# Patient Record
Sex: MALE | Race: WHITE | NOT HISPANIC OR LATINO
[De-identification: names, ages, dates, MRNs, and addresses within clinical notes are randomized per-mention and may not be internally consistent; named-entity substitution may affect disease eponyms.]

---

## 2017-04-01 ENCOUNTER — TRANSCRIPTION ENCOUNTER (OUTPATIENT)
Age: 77
End: 2017-04-01

## 2018-04-08 ENCOUNTER — TRANSCRIPTION ENCOUNTER (OUTPATIENT)
Age: 78
End: 2018-04-08

## 2021-12-08 PROBLEM — Z00.00 ENCOUNTER FOR PREVENTIVE HEALTH EXAMINATION: Status: ACTIVE | Noted: 2021-12-08

## 2022-01-06 ENCOUNTER — NON-APPOINTMENT (OUTPATIENT)
Age: 82
End: 2022-01-06

## 2022-01-06 ENCOUNTER — APPOINTMENT (OUTPATIENT)
Dept: HEART AND VASCULAR | Facility: CLINIC | Age: 82
End: 2022-01-06
Payer: MEDICARE

## 2022-01-06 VITALS
RESPIRATION RATE: 12 BRPM | BODY MASS INDEX: 27.77 KG/M2 | WEIGHT: 194 LBS | OXYGEN SATURATION: 97 % | TEMPERATURE: 97.3 F | HEIGHT: 70 IN | SYSTOLIC BLOOD PRESSURE: 132 MMHG | DIASTOLIC BLOOD PRESSURE: 68 MMHG | HEART RATE: 60 BPM

## 2022-01-06 DIAGNOSIS — Z80.1 FAMILY HISTORY OF MALIGNANT NEOPLASM OF TRACHEA, BRONCHUS AND LUNG: ICD-10-CM

## 2022-01-06 DIAGNOSIS — Z81.1 FAMILY HISTORY OF ALCOHOL ABUSE AND DEPENDENCE: ICD-10-CM

## 2022-01-06 DIAGNOSIS — Z85.820 PERSONAL HISTORY OF MALIGNANT MELANOMA OF SKIN: ICD-10-CM

## 2022-01-06 DIAGNOSIS — Z87.891 PERSONAL HISTORY OF NICOTINE DEPENDENCE: ICD-10-CM

## 2022-01-06 DIAGNOSIS — Z86.79 PERSONAL HISTORY OF OTHER DISEASES OF THE CIRCULATORY SYSTEM: ICD-10-CM

## 2022-01-06 DIAGNOSIS — E80.6 OTHER DISORDERS OF BILIRUBIN METABOLISM: ICD-10-CM

## 2022-01-06 DIAGNOSIS — Z82.3 FAMILY HISTORY OF STROKE: ICD-10-CM

## 2022-01-06 DIAGNOSIS — Z87.438 PERSONAL HISTORY OF OTHER DISEASES OF MALE GENITAL ORGANS: ICD-10-CM

## 2022-01-06 DIAGNOSIS — Z78.9 OTHER SPECIFIED HEALTH STATUS: ICD-10-CM

## 2022-01-06 DIAGNOSIS — Z79.01 LONG TERM (CURRENT) USE OF ANTICOAGULANTS: ICD-10-CM

## 2022-01-06 DIAGNOSIS — Z83.79 FAMILY HISTORY OF OTHER DISEASES OF THE DIGESTIVE SYSTEM: ICD-10-CM

## 2022-01-06 DIAGNOSIS — Z87.39 PERSONAL HISTORY OF OTHER DISEASES OF THE MUSCULOSKELETAL SYSTEM AND CONNECTIVE TISSUE: ICD-10-CM

## 2022-01-06 PROCEDURE — 93000 ELECTROCARDIOGRAM COMPLETE: CPT

## 2022-01-06 PROCEDURE — 99215 OFFICE O/P EST HI 40 MIN: CPT

## 2022-01-06 RX ORDER — METOPROLOL SUCCINATE 50 MG/1
50 TABLET, EXTENDED RELEASE ORAL
Refills: 0 | Status: DISCONTINUED | COMMUNITY
End: 2022-01-06

## 2022-01-06 NOTE — REASON FOR VISIT
[Arrhythmia/ECG Abnorrmalities] : arrhythmia/ECG abnormalities [Structural Heart and Valve Disease] : structural heart and valve disease [Hyperlipidemia] : hyperlipidemia [Hypertension] : hypertension [Coronary Artery Disease] : coronary artery disease [Carotid, Aortic and Peripheral Vascular Disease] : carotid, aortic and peripheral vascular disease [FreeTextEntry3] : Ant Guevara

## 2022-01-06 NOTE — PHYSICAL EXAM
[Well Developed] : well developed [Well Nourished] : well nourished [No Acute Distress] : no acute distress [Normal Conjunctiva] : normal conjunctiva [Normal Venous Pressure] : normal venous pressure [No Carotid Bruit] : no carotid bruit [Normal S1, S2] : normal S1, S2 [No Rub] : no rub [No Gallop] : no gallop [Murmur] : murmur [Normal Rate] : normal [Irregularly Irregular] : irregularly irregular [Clear Lung Fields] : clear lung fields [Good Air Entry] : good air entry [No Respiratory Distress] : no respiratory distress  [Soft] : abdomen soft [Non Tender] : non-tender [No Masses/organomegaly] : no masses/organomegaly [Normal Bowel Sounds] : normal bowel sounds [Normal Gait] : normal gait [No Edema] : no edema [No Cyanosis] : no cyanosis [No Clubbing] : no clubbing [No Varicosities] : no varicosities [No Rash] : no rash [No Skin Lesions] : no skin lesions [Moves all extremities] : moves all extremities [No Focal Deficits] : no focal deficits [Normal Speech] : normal speech [Alert and Oriented] : alert and oriented [Normal memory] : normal memory [de-identified] : sys murmur

## 2022-01-06 NOTE — DISCUSSION/SUMMARY
[Atrial Fibrillation] : atrial fibrillation [Coronary Artery Disease] : coronary artery disease [Hyperlipidemia] : hyperlipidemia [Diet Modification] : diet modification [Exercise] : exercise [Hypertension] : hypertension [Exercise Regimen] : an exercise regimen [Dietary Modification] : dietary modification [Low Sodium Diet] : low sodium diet [Mitral Regurgitation] : mitral regurgitation [Compensated] : compensated [Venous Insufficiency] : venous insufficiency [Stable] : stable [None] : There are no changes in medication management [Exercise Rehab] : exercise rehabilitation [Patient] : the patient [de-identified] : TAA; carotid artery plaque [FreeTextEntry1] : TR - mild

## 2022-01-06 NOTE — HISTORY OF PRESENT ILLNESS
[FreeTextEntry1] : Timmy Ureña returns for follow up.  He was last seen by me over 10 years ago.  \par \par He has been followed by Dr Logan Rodríguez.\par \par He denies cp, sob, pnd, orthopnea, edema, palp, or loc.\par \par He is active.  He is compliant with meds.\par \par ECG today reveals AIFB, NS ST T changes, consider ischemia\par \par Clinical hx reviewed in detail.\par \par Recommendations:\par 1. collect records from [facility]\par 2. collect records from primary care \par 3. exercise\par 4. continue CV meds\par 5. EXSE\par 6. carotid duplex \par

## 2022-07-21 ENCOUNTER — NON-APPOINTMENT (OUTPATIENT)
Age: 82
End: 2022-07-21

## 2023-02-13 ENCOUNTER — NON-APPOINTMENT (OUTPATIENT)
Age: 83
End: 2023-02-13

## 2023-03-22 ENCOUNTER — TRANSCRIPTION ENCOUNTER (OUTPATIENT)
Age: 83
End: 2023-03-22

## 2023-05-01 ENCOUNTER — APPOINTMENT (OUTPATIENT)
Dept: HEART AND VASCULAR | Facility: CLINIC | Age: 83
End: 2023-05-01
Payer: MEDICARE

## 2023-05-01 ENCOUNTER — NON-APPOINTMENT (OUTPATIENT)
Age: 83
End: 2023-05-01

## 2023-05-01 VITALS
OXYGEN SATURATION: 96 % | HEIGHT: 70 IN | SYSTOLIC BLOOD PRESSURE: 130 MMHG | DIASTOLIC BLOOD PRESSURE: 70 MMHG | WEIGHT: 180 LBS | HEART RATE: 94 BPM | RESPIRATION RATE: 12 BRPM | TEMPERATURE: 97.3 F | BODY MASS INDEX: 25.77 KG/M2

## 2023-05-01 PROCEDURE — 93000 ELECTROCARDIOGRAM COMPLETE: CPT

## 2023-05-01 PROCEDURE — 99215 OFFICE O/P EST HI 40 MIN: CPT

## 2023-05-01 RX ORDER — OXYCODONE AND ACETAMINOPHEN 5; 325 MG/1; MG/1
5-325 TABLET ORAL
Qty: 10 | Refills: 0 | Status: COMPLETED | COMMUNITY
Start: 2023-03-22

## 2023-05-01 RX ORDER — PREDNISONE 20 MG/1
20 TABLET ORAL
Qty: 60 | Refills: 0 | Status: COMPLETED | COMMUNITY
Start: 2023-02-18

## 2023-05-01 RX ORDER — KETOCONAZOLE 20 MG/G
2 CREAM TOPICAL
Qty: 30 | Refills: 0 | Status: COMPLETED | COMMUNITY
Start: 2022-12-25

## 2023-05-02 ENCOUNTER — NON-APPOINTMENT (OUTPATIENT)
Age: 83
End: 2023-05-02

## 2023-05-02 NOTE — HISTORY OF PRESENT ILLNESS
[FreeTextEntry1] : Timmy Ureña returns for follow up.  He was last seen by me in January 2022.\par \par He has been followed by Dr Logan Rodríguez.\par \par He injued his L LE in a screen door several days ago. He did not have pain.  He did develop significant ecchymosis in L LE that is currently present in the foot/ankle.  He was partially evaluated at [facility] - LE venous duplex was unremarkable for DVT.\par \par He denies cp, sob, pnd, orthopnea, edema, palp, or loc.\par \par He is active.  He is compliant with meds.\par \par ECG today reveals AIFB, RBBB, ST T changes, consider ischemia\par \par Clinical hx reviewed in detail.\par \par Recommendations:\par 1. collect records from [facility]\par 2. collect records from primary care \par 3. collect records from [place]\par 4. continue CV meds\par 5. warm compresses and leg elevation\par

## 2023-05-02 NOTE — DISCUSSION/SUMMARY
[Atrial Fibrillation] : atrial fibrillation [Coronary Artery Disease] : coronary artery disease [Hyperlipidemia] : hyperlipidemia [Diet Modification] : diet modification [Exercise] : exercise [Hypertension] : hypertension [Exercise Regimen] : an exercise regimen [Dietary Modification] : dietary modification [Low Sodium Diet] : low sodium diet [Mitral Regurgitation] : mitral regurgitation [Compensated] : compensated [Venous Insufficiency] : venous insufficiency [Stable] : stable [None] : There are no changes in medication management [Exercise Rehab] : exercise rehabilitation [Patient] : the patient [de-identified] : TAA; carotid artery plaque [FreeTextEntry1] : TR - mild [EKG obtained to assist in diagnosis and management of assessed problem(s)] : EKG obtained to assist in diagnosis and management of assessed problem(s)

## 2023-05-02 NOTE — PHYSICAL EXAM
[Well Developed] : well developed [Well Nourished] : well nourished [No Acute Distress] : no acute distress [Normal Conjunctiva] : normal conjunctiva [Normal Venous Pressure] : normal venous pressure [No Carotid Bruit] : no carotid bruit [Normal S1, S2] : normal S1, S2 [No Rub] : no rub [No Gallop] : no gallop [Murmur] : murmur [Normal Rate] : normal [Irregularly Irregular] : irregularly irregular [Clear Lung Fields] : clear lung fields [Good Air Entry] : good air entry [No Respiratory Distress] : no respiratory distress  [Soft] : abdomen soft [Non Tender] : non-tender [No Masses/organomegaly] : no masses/organomegaly [Normal Bowel Sounds] : normal bowel sounds [Normal Gait] : normal gait [No Cyanosis] : no cyanosis [No Clubbing] : no clubbing [Edema ___] : edema [unfilled] [Venous varicosities] : venous varicosities [No Rash] : no rash [No Skin Lesions] : no skin lesions [Moves all extremities] : moves all extremities [No Focal Deficits] : no focal deficits [Normal Speech] : normal speech [Alert and Oriented] : alert and oriented [Normal memory] : normal memory [de-identified] : sys murmur

## 2023-05-05 ENCOUNTER — NON-APPOINTMENT (OUTPATIENT)
Age: 83
End: 2023-05-05

## 2023-05-26 ENCOUNTER — APPOINTMENT (OUTPATIENT)
Dept: VASCULAR SURGERY | Facility: CLINIC | Age: 83
End: 2023-05-26
Payer: MEDICARE

## 2023-05-26 PROCEDURE — 99204 OFFICE O/P NEW MOD 45 MIN: CPT

## 2023-06-02 ENCOUNTER — APPOINTMENT (OUTPATIENT)
Dept: VASCULAR SURGERY | Facility: CLINIC | Age: 83
End: 2023-06-02
Payer: MEDICARE

## 2023-06-02 VITALS
BODY MASS INDEX: 26.92 KG/M2 | TEMPERATURE: 97.6 F | RESPIRATION RATE: 16 BRPM | DIASTOLIC BLOOD PRESSURE: 70 MMHG | WEIGHT: 188 LBS | OXYGEN SATURATION: 97 % | HEART RATE: 76 BPM | SYSTOLIC BLOOD PRESSURE: 140 MMHG | HEIGHT: 70 IN

## 2023-06-02 DIAGNOSIS — R60.9 EDEMA, UNSPECIFIED: ICD-10-CM

## 2023-06-02 PROCEDURE — 99213 OFFICE O/P EST LOW 20 MIN: CPT

## 2023-06-02 RX ORDER — GABAPENTIN 300 MG/1
300 CAPSULE ORAL
Qty: 30 | Refills: 0 | Status: DISCONTINUED | COMMUNITY
Start: 2023-02-11 | End: 2023-06-02

## 2023-06-02 RX ORDER — MUPIROCIN 20 MG/G
2 OINTMENT TOPICAL
Refills: 0 | Status: DISCONTINUED | COMMUNITY
End: 2023-06-02

## 2023-06-03 NOTE — HISTORY OF PRESENT ILLNESS
[FreeTextEntry1] : 81 yo male seen last week with severe bilateral lower extremity edema returns in follow up. He reports that the edema has improved since last week. He is taking Lasix as prescribed. He is unable to wear compression stockings and cannot apply ace wraps.

## 2023-06-03 NOTE — REVIEW OF SYSTEMS
[Fever] : no fever [Chills] : no chills [Lower Ext Edema] : lower extremity edema [Limb Swelling] : limb swelling

## 2023-06-03 NOTE — ASSESSMENT
[FreeTextEntry1] : 81 yo male with severe lower extremity edema. His edema has improved with diuretic therapy although it is still significant. I recommended that he continue to take Lasix as prescribed. He was placed in bilateral lower extremity ace wraps. He was scheduled for a venous duplex. \par \par Follow up when the results of the duplex are available.

## 2023-06-03 NOTE — PHYSICAL EXAM
[JVD] : no jugular venous distention  [Normal Breath Sounds] : Normal breath sounds [Ankle Swelling (On Exam)] : present [Ankle Swelling Bilaterally] : bilaterally  [Ankle Swelling On The Left] : moderate [Alert] : alert [Oriented to Person] : oriented to person [Oriented to Place] : oriented to place [Oriented to Time] : oriented to time [de-identified] : Awake and Alert [de-identified] : right forefoot erythema impoved [de-identified] : Appropriate

## 2023-06-09 ENCOUNTER — APPOINTMENT (OUTPATIENT)
Dept: HEART AND VASCULAR | Facility: CLINIC | Age: 83
End: 2023-06-09
Payer: MEDICARE

## 2023-06-09 PROCEDURE — 93970 EXTREMITY STUDY: CPT

## 2023-06-26 ENCOUNTER — APPOINTMENT (OUTPATIENT)
Dept: VASCULAR SURGERY | Facility: CLINIC | Age: 83
End: 2023-06-26

## 2023-07-31 ENCOUNTER — APPOINTMENT (OUTPATIENT)
Dept: HEART AND VASCULAR | Facility: CLINIC | Age: 83
End: 2023-07-31
Payer: MEDICARE

## 2023-07-31 VITALS
BODY MASS INDEX: 24.62 KG/M2 | HEIGHT: 70 IN | SYSTOLIC BLOOD PRESSURE: 122 MMHG | DIASTOLIC BLOOD PRESSURE: 80 MMHG | WEIGHT: 172 LBS | RESPIRATION RATE: 17 BRPM | OXYGEN SATURATION: 97 % | HEART RATE: 74 BPM | TEMPERATURE: 97.6 F

## 2023-07-31 PROCEDURE — 99215 OFFICE O/P EST HI 40 MIN: CPT

## 2023-08-01 ENCOUNTER — APPOINTMENT (OUTPATIENT)
Dept: HEART AND VASCULAR | Facility: CLINIC | Age: 83
End: 2023-08-01
Payer: MEDICARE

## 2023-08-01 PROCEDURE — 93306 TTE W/DOPPLER COMPLETE: CPT

## 2023-08-06 NOTE — REASON FOR VISIT
[Arrhythmia/ECG Abnorrmalities] : arrhythmia/ECG abnormalities [Structural Heart and Valve Disease] : structural heart and valve disease [Hypertension] : hypertension [Hyperlipidemia] : hyperlipidemia [Coronary Artery Disease] : coronary artery disease [Carotid, Aortic and Peripheral Vascular Disease] : carotid, aortic and peripheral vascular disease [FreeTextEntry3] : Ant Guevara

## 2023-08-06 NOTE — DISCUSSION/SUMMARY
[Bundle Branch Block] : ~T bundle branch block [Atrial Fibrillation] : atrial fibrillation [Coronary Artery Disease] : coronary artery disease [Hyperlipidemia] : hyperlipidemia [Diet Modification] : diet modification [Exercise] : exercise [Hypertension] : hypertension [Exercise Regimen] : an exercise regimen [Dietary Modification] : dietary modification [Low Sodium Diet] : low sodium diet [Mitral Regurgitation] : mitral regurgitation [Compensated] : compensated [Sodium Restriction] : sodium restriction [Venous Insufficiency] : venous insufficiency [None] : There are no changes in medication management [Exercise Rehab] : exercise rehabilitation [Pulmonary Hypertension (PH)] : pulmonary hypertension (PH) [Stable] : stable [Medication Changes Per Orders] : Medication changes are as documented in orders [Patient] : the patient [de-identified] : LVH [de-identified] : at least moderate [de-identified] : TAA; carotid artery plaque [de-identified] : mild [FreeTextEntry1] : TR - mild to mod

## 2023-08-06 NOTE — PHYSICAL EXAM
[Well Developed] : well developed [Well Nourished] : well nourished [No Acute Distress] : no acute distress [Normal Conjunctiva] : normal conjunctiva [Normal Venous Pressure] : normal venous pressure [No Carotid Bruit] : no carotid bruit [Normal S1, S2] : normal S1, S2 [No Rub] : no rub [No Gallop] : no gallop [Normal Rate] : normal [Murmur] : murmur [Irregularly Irregular] : irregularly irregular [Clear Lung Fields] : clear lung fields [Good Air Entry] : good air entry [No Respiratory Distress] : no respiratory distress  [Soft] : abdomen soft [Non Tender] : non-tender [No Masses/organomegaly] : no masses/organomegaly [Normal Bowel Sounds] : normal bowel sounds [Normal Gait] : normal gait [No Cyanosis] : no cyanosis [No Clubbing] : no clubbing [Edema ___] : edema [unfilled] [No Rash] : no rash [Venous varicosities] : venous varicosities [No Skin Lesions] : no skin lesions [Moves all extremities] : moves all extremities [No Focal Deficits] : no focal deficits [Normal Speech] : normal speech [Alert and Oriented] : alert and oriented [Normal memory] : normal memory [de-identified] : sys murmur

## 2023-08-06 NOTE — HISTORY OF PRESENT ILLNESS
[FreeTextEntry1] : Timmy Ureña returns for follow up.    He has been followed by Dr Logan Rodríguez.  He denies cp, sob, pnd, orthopnea, palp, or loc.  He has LE edema - currently better.  He is active.  He is compliant with meds.  Clinical hx reviewed in detail.  Recommendations: 1. continue CV meds 2. compression stocking and leg elevation 3. TTE - see report 4. Vasc Med eval 5. advanced cardiac imaging - cardiac MRI vs NIKOLAI

## 2023-08-17 ENCOUNTER — NON-APPOINTMENT (OUTPATIENT)
Age: 83
End: 2023-08-17

## 2023-08-23 ENCOUNTER — OUTPATIENT (OUTPATIENT)
Dept: OUTPATIENT SERVICES | Facility: HOSPITAL | Age: 83
LOS: 1 days | End: 2023-08-23
Payer: MEDICARE

## 2023-08-23 DIAGNOSIS — I71.20 THORACIC AORTIC ANEURYSM, WITHOUT RUPTURE, UNSPECIFIED: ICD-10-CM

## 2023-08-23 DIAGNOSIS — I34.9 NONRHEUMATIC MITRAL VALVE DISORDER, UNSPECIFIED: ICD-10-CM

## 2023-08-23 PROCEDURE — 76376 3D RENDER W/INTRP POSTPROCES: CPT | Mod: 26

## 2023-08-23 PROCEDURE — 93312 ECHO TRANSESOPHAGEAL: CPT | Mod: 26

## 2023-08-23 PROCEDURE — 93312 ECHO TRANSESOPHAGEAL: CPT

## 2024-01-04 ENCOUNTER — APPOINTMENT (OUTPATIENT)
Dept: HEART AND VASCULAR | Facility: CLINIC | Age: 84
End: 2024-01-04
Payer: MEDICARE

## 2024-01-04 VITALS
OXYGEN SATURATION: 98 % | TEMPERATURE: 97.6 F | SYSTOLIC BLOOD PRESSURE: 120 MMHG | DIASTOLIC BLOOD PRESSURE: 57 MMHG | RESPIRATION RATE: 12 BRPM | WEIGHT: 175 LBS | BODY MASS INDEX: 25.05 KG/M2 | HEART RATE: 54 BPM | HEIGHT: 70 IN

## 2024-01-04 DIAGNOSIS — I34.9 NONRHEUMATIC MITRAL VALVE DISORDER, UNSPECIFIED: ICD-10-CM

## 2024-01-04 DIAGNOSIS — I25.10 ATHEROSCLEROTIC HEART DISEASE OF NATIVE CORONARY ARTERY W/OUT ANGINA PECTORIS: ICD-10-CM

## 2024-01-04 DIAGNOSIS — I87.2 VENOUS INSUFFICIENCY (CHRONIC) (PERIPHERAL): ICD-10-CM

## 2024-01-04 DIAGNOSIS — I65.29 OCCLUSION AND STENOSIS OF UNSPECIFIED CAROTID ARTERY: ICD-10-CM

## 2024-01-04 DIAGNOSIS — I51.7 CARDIOMEGALY: ICD-10-CM

## 2024-01-04 DIAGNOSIS — I36.9 NONRHEUMATIC TRICUSPID VALVE DISORDER, UNSPECIFIED: ICD-10-CM

## 2024-01-04 DIAGNOSIS — I73.9 PERIPHERAL VASCULAR DISEASE, UNSPECIFIED: ICD-10-CM

## 2024-01-04 PROCEDURE — 99215 OFFICE O/P EST HI 40 MIN: CPT

## 2024-01-04 RX ORDER — TAMSULOSIN HYDROCHLORIDE 0.4 MG/1
0.4 CAPSULE ORAL
Refills: 0 | Status: DISCONTINUED | COMMUNITY
End: 2024-01-04

## 2024-01-04 NOTE — DISCUSSION/SUMMARY
[Bundle Branch Block] : ~T bundle branch block [Atrial Fibrillation] : atrial fibrillation [Coronary Artery Disease] : coronary artery disease [Hyperlipidemia] : hyperlipidemia [Diet Modification] : diet modification [Exercise] : exercise [Hypertension] : hypertension [Exercise Regimen] : an exercise regimen [Dietary Modification] : dietary modification [Low Sodium Diet] : low sodium diet [Mitral Regurgitation] : mitral regurgitation [Compensated] : compensated [Sodium Restriction] : sodium restriction [Venous Insufficiency] : venous insufficiency [None] : There are no changes in medication management [Exercise Rehab] : exercise rehabilitation [Pulmonary Hypertension (PH)] : pulmonary hypertension (PH) [Stable] : stable [Medication Changes Per Orders] : Medication changes are as documented in orders [Patient] : the patient [de-identified] : LVH [de-identified] : at least moderate [de-identified] : TAA; carotid artery plaque [de-identified] : mild [FreeTextEntry1] : TR - mild to mod

## 2024-01-04 NOTE — PHYSICAL EXAM
[Well Developed] : well developed [Well Nourished] : well nourished [No Acute Distress] : no acute distress [Normal Conjunctiva] : normal conjunctiva [Normal Venous Pressure] : normal venous pressure [No Carotid Bruit] : no carotid bruit [Normal S1, S2] : normal S1, S2 [No Rub] : no rub [No Gallop] : no gallop [Murmur] : murmur [Normal Rate] : normal [Irregularly Irregular] : irregularly irregular [Clear Lung Fields] : clear lung fields [Good Air Entry] : good air entry [No Respiratory Distress] : no respiratory distress  [Soft] : abdomen soft [Non Tender] : non-tender [No Masses/organomegaly] : no masses/organomegaly [Normal Bowel Sounds] : normal bowel sounds [Normal Gait] : normal gait [No Cyanosis] : no cyanosis [No Clubbing] : no clubbing [Edema ___] : edema [unfilled] [Venous varicosities] : venous varicosities [No Rash] : no rash [No Skin Lesions] : no skin lesions [Moves all extremities] : moves all extremities [No Focal Deficits] : no focal deficits [Normal Speech] : normal speech [Alert and Oriented] : alert and oriented [Normal memory] : normal memory [de-identified] : sys murmur

## 2024-01-04 NOTE — HISTORY OF PRESENT ILLNESS
[FreeTextEntry1] : Timmy Ureña returns for follow up.    He has been followed by Dr Logan Rodríguez.  He denies cp, sob, pnd, orthopnea, palp, or loc.  He has LE edema - currently improved, he wears compression socks.  He is more active after right hip replacement (8/2023).  He is compliant with meds.  TTE 8/2023: nl lv sys fxn; LVH; indeterminant mendoza fxn; MV thickening w/ mod MR; mild to mod TR; mild pulm htn NIKOLAI 8/2023: nl left and right ventricular fxn; no LA/RA/LESLY/RAA thrombus, no intracardiac shunt, moderate MR  Clinical hx reviewed in detail.  Recommendations: 1. continue CV meds; continue Eliquis 5mg BID 2. compression stocking and leg elevation, vasc med eval, provided referral to Dr. Trinidad 3. pt is walking more after R hip replacement, will order EXSE 4. f/u in 4 months

## 2024-02-05 ENCOUNTER — RESULT REVIEW (OUTPATIENT)
Age: 84
End: 2024-02-05

## 2024-02-06 ENCOUNTER — RESULT REVIEW (OUTPATIENT)
Age: 84
End: 2024-02-06

## 2024-02-07 ENCOUNTER — TRANSCRIPTION ENCOUNTER (OUTPATIENT)
Age: 84
End: 2024-02-07

## 2024-04-15 ENCOUNTER — RX RENEWAL (OUTPATIENT)
Age: 84
End: 2024-04-15

## 2024-04-29 ENCOUNTER — APPOINTMENT (OUTPATIENT)
Dept: HEART AND VASCULAR | Facility: CLINIC | Age: 84
End: 2024-04-29
Payer: MEDICARE

## 2024-04-29 VITALS
BODY MASS INDEX: 24.34 KG/M2 | SYSTOLIC BLOOD PRESSURE: 122 MMHG | DIASTOLIC BLOOD PRESSURE: 60 MMHG | HEART RATE: 57 BPM | OXYGEN SATURATION: 97 % | HEIGHT: 70 IN | WEIGHT: 170 LBS

## 2024-04-29 PROCEDURE — 93325 DOPPLER ECHO COLOR FLOW MAPG: CPT

## 2024-04-29 PROCEDURE — 93351 STRESS TTE COMPLETE: CPT

## 2024-04-29 PROCEDURE — 93246 EXT ECG>7D<15D RECORDING: CPT

## 2024-04-29 PROCEDURE — 93320 DOPPLER ECHO COMPLETE: CPT

## 2024-04-30 LAB
ALBUMIN SERPL ELPH-MCNC: 4.2 G/DL
ALP BLD-CCNC: 129 U/L
ALT SERPL-CCNC: 25 U/L
ANION GAP SERPL CALC-SCNC: 14 MMOL/L
AST SERPL-CCNC: 34 U/L
BILIRUB SERPL-MCNC: 1.7 MG/DL
BUN SERPL-MCNC: 19 MG/DL
CALCIUM SERPL-MCNC: 9.4 MG/DL
CHLORIDE SERPL-SCNC: 103 MMOL/L
CO2 SERPL-SCNC: 24 MMOL/L
CREAT SERPL-MCNC: 0.8 MG/DL
EGFR: 87 ML/MIN/1.73M2
GLUCOSE SERPL-MCNC: 108 MG/DL
MAGNESIUM SERPL-MCNC: 2 MG/DL
NT-PROBNP SERPL-MCNC: 2263 PG/ML
POTASSIUM SERPL-SCNC: 4 MMOL/L
PROT SERPL-MCNC: 6.6 G/DL
SODIUM SERPL-SCNC: 141 MMOL/L

## 2024-05-02 ENCOUNTER — NON-APPOINTMENT (OUTPATIENT)
Age: 84
End: 2024-05-02

## 2024-05-05 ENCOUNTER — RESULT REVIEW (OUTPATIENT)
Age: 84
End: 2024-05-05

## 2024-05-06 ENCOUNTER — APPOINTMENT (OUTPATIENT)
Dept: HEART AND VASCULAR | Facility: CLINIC | Age: 84
End: 2024-05-06
Payer: MEDICARE

## 2024-05-06 ENCOUNTER — RESULT REVIEW (OUTPATIENT)
Age: 84
End: 2024-05-06

## 2024-05-06 PROCEDURE — 93248 EXT ECG>7D<15D REV&INTERPJ: CPT

## 2024-05-06 PROCEDURE — 93246 EXT ECG>7D<15D RECORDING: CPT

## 2024-05-10 ENCOUNTER — NON-APPOINTMENT (OUTPATIENT)
Age: 84
End: 2024-05-10

## 2024-05-20 ENCOUNTER — NON-APPOINTMENT (OUTPATIENT)
Age: 84
End: 2024-05-20

## 2024-05-20 ENCOUNTER — APPOINTMENT (OUTPATIENT)
Dept: HEART AND VASCULAR | Facility: CLINIC | Age: 84
End: 2024-05-20
Payer: MEDICARE

## 2024-05-20 VITALS
RESPIRATION RATE: 14 BRPM | SYSTOLIC BLOOD PRESSURE: 126 MMHG | HEIGHT: 70 IN | WEIGHT: 170 LBS | OXYGEN SATURATION: 98 % | HEART RATE: 55 BPM | DIASTOLIC BLOOD PRESSURE: 54 MMHG | BODY MASS INDEX: 24.34 KG/M2

## 2024-05-20 DIAGNOSIS — I48.91 UNSPECIFIED ATRIAL FIBRILLATION: ICD-10-CM

## 2024-05-20 DIAGNOSIS — I10 ESSENTIAL (PRIMARY) HYPERTENSION: ICD-10-CM

## 2024-05-20 DIAGNOSIS — E78.5 HYPERLIPIDEMIA, UNSPECIFIED: ICD-10-CM

## 2024-05-20 DIAGNOSIS — I45.10 UNSPECIFIED RIGHT BUNDLE-BRANCH BLOCK: ICD-10-CM

## 2024-05-20 DIAGNOSIS — I71.20 THORACIC AORTIC ANEURYSM, WITHOUT RUPTURE, UNSPECIFIED: ICD-10-CM

## 2024-05-20 PROCEDURE — 99215 OFFICE O/P EST HI 40 MIN: CPT

## 2024-05-20 PROCEDURE — 93000 ELECTROCARDIOGRAM COMPLETE: CPT

## 2024-06-03 ENCOUNTER — APPOINTMENT (OUTPATIENT)
Dept: HEART AND VASCULAR | Facility: CLINIC | Age: 84
End: 2024-06-03
Payer: MEDICARE

## 2024-06-03 PROCEDURE — 36415 COLL VENOUS BLD VENIPUNCTURE: CPT

## 2024-06-05 ENCOUNTER — NON-APPOINTMENT (OUTPATIENT)
Age: 84
End: 2024-06-05

## 2024-06-05 LAB
ALBUMIN SERPL ELPH-MCNC: 4.1 G/DL
ALP BLD-CCNC: 122 U/L
ALT SERPL-CCNC: 25 U/L
ANION GAP SERPL CALC-SCNC: 13 MMOL/L
AST SERPL-CCNC: 37 U/L
BILIRUB SERPL-MCNC: 1 MG/DL
BUN SERPL-MCNC: 25 MG/DL
CALCIUM SERPL-MCNC: 9.6 MG/DL
CHLORIDE SERPL-SCNC: 103 MMOL/L
CO2 SERPL-SCNC: 25 MMOL/L
CREAT SERPL-MCNC: 0.88 MG/DL
EGFR: 84 ML/MIN/1.73M2
GLUCOSE SERPL-MCNC: 91 MG/DL
NT-PROBNP SERPL-MCNC: 1915 PG/ML
POTASSIUM SERPL-SCNC: 4.8 MMOL/L
PROT SERPL-MCNC: 7.4 G/DL
SODIUM SERPL-SCNC: 140 MMOL/L

## 2024-06-09 RX ORDER — APIXABAN 5 MG/1
5 TABLET, FILM COATED ORAL
Qty: 180 | Refills: 3 | Status: ACTIVE | COMMUNITY
Start: 1900-01-01 | End: 1900-01-01

## 2024-06-09 RX ORDER — CARVEDILOL 3.12 MG/1
3.12 TABLET, FILM COATED ORAL
Qty: 180 | Refills: 3 | Status: ACTIVE | COMMUNITY
Start: 1900-01-01 | End: 1900-01-01

## 2024-06-09 RX ORDER — FUROSEMIDE 40 MG/1
40 TABLET ORAL
Qty: 90 | Refills: 3 | Status: ACTIVE | COMMUNITY
Start: 2023-06-14 | End: 1900-01-01

## 2024-06-09 RX ORDER — ROSUVASTATIN CALCIUM 20 MG/1
20 TABLET, FILM COATED ORAL
Qty: 90 | Refills: 3 | Status: ACTIVE | COMMUNITY
Start: 1900-01-01 | End: 1900-01-01

## 2024-06-09 RX ORDER — LOSARTAN POTASSIUM 25 MG/1
25 TABLET, FILM COATED ORAL
Qty: 90 | Refills: 3 | Status: ACTIVE | COMMUNITY
Start: 2024-05-20 | End: 1900-01-01

## 2024-06-09 NOTE — HISTORY OF PRESENT ILLNESS
[FreeTextEntry1] : 82 y/o male w/ PMHx of persistent AF, RBBB, non-obstructive CAD, HTN, HLD, TAA (normal on most recent TTE) presents for CV follow up and for results.  Exercises daily, walks one mile every morning and one mile every evening. Denies exertional symptoms.  LE edema has improved, he is wearing compression socks. No PND or orthopnea.  Here for follow up after recent testing Denies any significant chest discomfort, palpitations, dyspnea, or syncope.  Cardiac Workup: TTE 8/2023: nl lv sys fxn; LVH; indeterminant mendoza fxn; MV thickening w/ mod MR; mild to mod TR; mild pulm htn NIKOLAI 8/2023: nl left and right ventricular fxn; no LA/RA/LESLY/RAA thrombus, no intracardiac shunt, moderate MR Coronary CTA 4/2024: , mod RCA, mild-mod prox LAD, min LCX, distal LAD probably non-obs; hypoattenuation noted in the left atrial appendage which may represent thrombus or slow flow. CT-FFR: LAD: 0.91 distal, LCx: 0.95, mid RCA: 0.94 mid, 0.94 distal  Holter: NSVT 54 episodes, Afib 100%, PVC 2.6% EXSE 04/2024: normal stress echo; exercise time 7 min 35 sec (9.8 METs), 94% MPHR achieved. No EKG changes or echocardiographic changes c/w ischemia Exercise-induced arrythmia including non-sustained ventricular tachycardia and frequent premature ventricular contractions. TTE 04/2024: normal LV size/function. LVEF 65%, bi-atrial enlargement, PASP 44, dil IVC with elevated RAP, mod MR with eccentric jet

## 2024-06-09 NOTE — ASSESSMENT
[FreeTextEntry1] : 82 y/o male w/ PMHx of persistent AF, RBBB, non-obstructive CAD, HTN, HLD, TAA (normal on most recent TTE) presents for CV follow up and for results.   #ASCVD - non-obs CAD on CCTA, FFR negative - cath if symptomatic - ASA if he can tolerate - continue crestor - continued risk factor modification - Cardiovascular risk factors discussed  # Atrial fibrillation --Advised pt to continue Coreg dose 3.125mg daily.  --continue eliquis  # Moderate MR --Advised SHD consult, patient is not experiencing symptoms of fluid overload, pt is resistant at this time.  --Will start Losartan 25mg daily, advised avoid high potassium foods, plan to repeat BNP and CMP in 2 weeks --lasix as tolerated  #NSVT and A.fib  --will benefit from EP consult --continue beta blocker for now  Follow up in 3 months

## 2024-09-17 ENCOUNTER — NON-APPOINTMENT (OUTPATIENT)
Age: 84
End: 2024-09-17

## 2024-09-17 RX ORDER — TAMSULOSIN HYDROCHLORIDE 0.4 MG/1
0.4 CAPSULE ORAL AT BEDTIME
Refills: 0 | Status: ACTIVE | COMMUNITY

## 2024-09-18 ENCOUNTER — APPOINTMENT (OUTPATIENT)
Dept: HEART AND VASCULAR | Facility: CLINIC | Age: 84
End: 2024-09-18
Payer: MEDICARE

## 2024-09-18 VITALS
OXYGEN SATURATION: 98 % | WEIGHT: 172 LBS | DIASTOLIC BLOOD PRESSURE: 54 MMHG | HEART RATE: 45 BPM | SYSTOLIC BLOOD PRESSURE: 124 MMHG | BODY MASS INDEX: 24.62 KG/M2 | HEIGHT: 70 IN | RESPIRATION RATE: 15 BRPM

## 2024-09-18 PROCEDURE — 99214 OFFICE O/P EST MOD 30 MIN: CPT

## 2024-09-24 NOTE — HISTORY OF PRESENT ILLNESS
[FreeTextEntry1] : 82 y/o male w/ PMHx of persistent AF, RBBB, non-obstructive CAD, HTN, HLD, TAA (normal on most recent TTE) presents for CV follow up and for results.  Exercises daily, walks one mile every morning and one mile every evening. Denies exertional symptoms.  Gets dyspneic going up the stairs; can go up 20 steps before he has to stop. LE edema has improved, he is wearing compression socks. No PND or orthopnea.  Recently stopped NOAC due to dental work. Now restarted; compliant. Denies any significant chest discomfort, palpitations, dyspnea, or syncope.  Cardiac Workup: TTE 8/2023: nl lv sys fxn; LVH; indeterminant mendoza fxn; MV thickening w/ mod MR; mild to mod TR; mild pulm htn NIKOLAI 8/2023: nl left and right ventricular fxn; no LA/RA/LESLY/RAA thrombus, no intracardiac shunt, moderate MR Coronary CTA 4/2024: , mod RCA, mild-mod prox LAD, min LCX, distal LAD probably non-obs; hypoattenuation noted in the left atrial appendage which may represent thrombus or slow flow. CT-FFR: LAD: 0.91 distal, LCx: 0.95, mid RCA: 0.94 mid, 0.94 distal  Holter: NSVT 54 episodes, Afib 100%, PVC 2.6% EXSE 04/2024: normal stress echo; exercise time 7 min 35 sec (9.8 METs), 94% MPHR achieved. No EKG changes or echocardiographic changes c/w ischemia Exercise-induced arrythmia including non-sustained ventricular tachycardia and frequent premature ventricular contractions. TTE 04/2024: normal LV size/function. LVEF 65%, bi-atrial enlargement, PASP 44, dil IVC with elevated RAP, mod MR with eccentric jet

## 2024-09-24 NOTE — HISTORY OF PRESENT ILLNESS
[FreeTextEntry1] : 84 y/o male w/ PMHx of persistent AF, RBBB, non-obstructive CAD, HTN, HLD, TAA (normal on most recent TTE) presents for CV follow up and for results.  Exercises daily, walks one mile every morning and one mile every evening. Denies exertional symptoms.  Gets dyspneic going up the stairs; can go up 20 steps before he has to stop. LE edema has improved, he is wearing compression socks. No PND or orthopnea.  Recently stopped NOAC due to dental work. Now restarted; compliant. Denies any significant chest discomfort, palpitations, dyspnea, or syncope.  Cardiac Workup: TTE 8/2023: nl lv sys fxn; LVH; indeterminant mendoza fxn; MV thickening w/ mod MR; mild to mod TR; mild pulm htn NIKOLAI 8/2023: nl left and right ventricular fxn; no LA/RA/LESLY/RAA thrombus, no intracardiac shunt, moderate MR Coronary CTA 4/2024: , mod RCA, mild-mod prox LAD, min LCX, distal LAD probably non-obs; hypoattenuation noted in the left atrial appendage which may represent thrombus or slow flow. CT-FFR: LAD: 0.91 distal, LCx: 0.95, mid RCA: 0.94 mid, 0.94 distal  Holter: NSVT 54 episodes, Afib 100%, PVC 2.6% EXSE 04/2024: normal stress echo; exercise time 7 min 35 sec (9.8 METs), 94% MPHR achieved. No EKG changes or echocardiographic changes c/w ischemia Exercise-induced arrythmia including non-sustained ventricular tachycardia and frequent premature ventricular contractions. TTE 04/2024: normal LV size/function. LVEF 65%, bi-atrial enlargement, PASP 44, dil IVC with elevated RAP, mod MR with eccentric jet

## 2024-09-24 NOTE — ASSESSMENT
[FreeTextEntry1] : 82 y/o male w/ PMHx of persistent AF, RBBB, non-obstructive CAD, HTN, HLD, TAA (normal on most recent TTE) presents for CV follow up and for results.   #ASCVD - non-obs CAD on CCTA, FFR negative, mild-mod LAD disease - cath if symptomatic - ASA if he can tolerate - continue crestor - continued risk factor modification - Cardiovascular risk factors discussed  # Atrial fibrillation --rec NIKOLAI to evaluate thrombus vs slow flow on CCTA and to eval MR --Advised pt to continue Coreg dose 3.125mg daily.  --continue eliquis  # Moderate MR --Advised SHD consult, patient is not experiencing symptoms of fluid overload, pt is resistant at this time.  --Will start Losartan 25mg daily, advised avoid high potassium foods, plan to repeat BNP and CMP in 2 weeks --lasix as tolerated  #NSVT and A.fib  --will benefit from EP consult --continue beta blocker for now  # Hyperlipidemia -  well controlled on crestor  - Continue current prescribed medications - Low fat low cholesterol diet - Heart healthy diet emphasized - Exercise as tolerated  Follow up in 3 months

## 2024-09-26 ENCOUNTER — RESULT REVIEW (OUTPATIENT)
Age: 84
End: 2024-09-26

## 2025-03-19 ENCOUNTER — APPOINTMENT (OUTPATIENT)
Dept: HEART AND VASCULAR | Facility: CLINIC | Age: 85
End: 2025-03-19

## 2025-03-19 ENCOUNTER — NON-APPOINTMENT (OUTPATIENT)
Age: 85
End: 2025-03-19

## 2025-03-19 VITALS
HEIGHT: 70 IN | OXYGEN SATURATION: 99 % | RESPIRATION RATE: 16 BRPM | WEIGHT: 173 LBS | HEART RATE: 52 BPM | BODY MASS INDEX: 24.77 KG/M2 | DIASTOLIC BLOOD PRESSURE: 56 MMHG | SYSTOLIC BLOOD PRESSURE: 130 MMHG

## 2025-03-19 PROCEDURE — 93000 ELECTROCARDIOGRAM COMPLETE: CPT

## 2025-03-19 PROCEDURE — 99215 OFFICE O/P EST HI 40 MIN: CPT

## 2025-04-16 ENCOUNTER — APPOINTMENT (OUTPATIENT)
Dept: HEART AND VASCULAR | Facility: CLINIC | Age: 85
End: 2025-04-16
Payer: MEDICARE

## 2025-04-16 PROCEDURE — 93306 TTE W/DOPPLER COMPLETE: CPT

## 2025-05-09 ENCOUNTER — APPOINTMENT (OUTPATIENT)
Dept: HEART AND VASCULAR | Facility: CLINIC | Age: 85
End: 2025-05-09
Payer: MEDICARE

## 2025-05-09 PROCEDURE — 93970 EXTREMITY STUDY: CPT
